# Patient Record
(demographics unavailable — no encounter records)

---

## 2024-11-02 NOTE — DISCUSSION/SUMMARY
[de-identified] : 71 Y F w/ DISH, lumbar stenosis, lumbar spondylosis.   Normal neurologic exam. [facility] L spine MRI reviewed & discussed with patient today. L3-4 advanced disc and facet degeneration, severe lateral recess stenosis BL. No symptoms of neurogenic claudication at this time. Will continue to monitor. No need for further treatment as pt is primarily asymptomatic.  Patient was provided with a lumbar home exercise program. Discussed proper body mechanics and modified physical activity to avoid aggravation of symptoms.   Moving forward I'd like to see as needed.   Prior to appointment and during encounter with patient extensive medical records were reviewed including but not limited to, hospital records, out patient records, imaging results, and lab data. During this appointment the patient was examined, diagnoses were discussed and explained in a face to face manner. In addition extensive time was spent reviewing aforementioned diagnostic studies. Counseling including abnormal image results, differential diagnoses, treatment options, risk and benefits, lifestyle changes, current condition, and current medications was performed. Patient's comments, questions, and concerns were address and patient verbalized understanding. Based on this patient's presentation at our office, which is an orthopedic spine surgeon's office, this patient inherently / intrinsically has a risk, however minute, of developing issues such as Cauda equina syndrome, bowel and bladder changes, or progression of motor or neurological deficits such as paralysis which may be permanent.   I, Tina Frederick, attest that this documentation has been prepared under the direction and in the presence of provider Josh Fermin MD.

## 2024-11-02 NOTE — PHYSICAL EXAM
[Flexion] : flexion [Extension] : extension [Bending to left] : bending to left [Bending to right] : bending to right [de-identified] : Constitutional: - General Appearance: Unremarkable Body Habitus Well Developed Well Nourished Body Habitus No Deformities Well Groomed Ability To communicate: Normal Neurologic: Global sensation is intact to upper and lower extremities. See examination of Neck and/or Spine for exceptions. Orientation to Time, Place and Person is: Normal Mood And Affect is Normal Skin: - Head/Face, Right Upper/Lower Extremity, Left Upper/Lower Extremity: Normal See Examination of Neck and/or Spine for exceptions Cardiovascular: Peripheral Cardiovascular System is Normal Palpation of Lymph Nodes: Normal Palpation of lymph nodes in: Axilla, Cervical, Inguinal Abnormal Palpation of lymph nodes in: None  [] : non-antalgic [FreeTextEntry9] : less than 5 degrees of lateral bending secondary to stiffness.   [TWNoteComboBox7] : forward flexion 75 degrees [de-identified] : left lateral bending 5 degrees [de-identified] : right lateral bending 5 degrees

## 2024-11-02 NOTE — HISTORY OF PRESENT ILLNESS
[Lower back] : lower back [Gradual] : gradual [6] : 6 [2] : 2 [Dull/Aching] : dull/aching [Localized] : localized [Sharp] : sharp [Intermittent] : intermittent [Household chores] : household chores [Leisure] : leisure [Sleep] : sleep [Rest] : rest [Standing] : standing [Walking] : walking [Retired] : Work status: retired [de-identified] : 10/30/2024: 71-year-old female presents today for an initial evaluation, referred by her PCP to further assess lumbar X-rays and MRIs that have been completed. She reports no significant back pain at this time, but experiences intermittent RT sided lower back pain. She also has intermittent posterior right thigh pain when transitioning from seated to standing and while navigating steps. There are no radicular pains in the BL LE, although the patient reports minor LE weakness attributed to a cardiac condition. Additionally, she has intermittent RT hip pain. PMHx: Cardiac conditions. The patient has lost 50 pounds since May 2024, which she attributes to Ozempic. [] : no [FreeTextEntry5] : 1mos of lumbar pain, no injury or fall/trauma.  One morning unable to get out of bed.  Pt went to PCP, xray/MRI was done at MRI.   [FreeTextEntry9] : Tylenol [de-identified] : PCP [de-identified] : xray/MRI Lumbar ZP

## 2024-11-02 NOTE — DISCUSSION/SUMMARY
[de-identified] : 71 Y F w/ DISH, lumbar stenosis, lumbar spondylosis.   Normal neurologic exam. [facility] L spine MRI reviewed & discussed with patient today. L3-4 advanced disc and facet degeneration, severe lateral recess stenosis BL. No symptoms of neurogenic claudication at this time. Will continue to monitor. No need for further treatment as pt is primarily asymptomatic.  Patient was provided with a lumbar home exercise program. Discussed proper body mechanics and modified physical activity to avoid aggravation of symptoms.   Moving forward I'd like to see as needed.   Prior to appointment and during encounter with patient extensive medical records were reviewed including but not limited to, hospital records, out patient records, imaging results, and lab data. During this appointment the patient was examined, diagnoses were discussed and explained in a face to face manner. In addition extensive time was spent reviewing aforementioned diagnostic studies. Counseling including abnormal image results, differential diagnoses, treatment options, risk and benefits, lifestyle changes, current condition, and current medications was performed. Patient's comments, questions, and concerns were address and patient verbalized understanding. Based on this patient's presentation at our office, which is an orthopedic spine surgeon's office, this patient inherently / intrinsically has a risk, however minute, of developing issues such as Cauda equina syndrome, bowel and bladder changes, or progression of motor or neurological deficits such as paralysis which may be permanent.   I, Tina Frederick, attest that this documentation has been prepared under the direction and in the presence of provider Josh Fermin MD.

## 2024-11-02 NOTE — DATA REVIEWED
[FreeTextEntry1] : I have independently reviewed and interpreted these images and reports. [facility] on 08/20/24 RT hip XR- mild RT hip OA mild RT SIJ DJD.  I have independently reviewed and interpreted these images and reports. [facility] Lumbar XR- LT GLORIA adv DDD w/ large anterior and lateral osteophytes, scoliotic curvature of 14 degrees.   On my interpretation of these images from [facility] on I have additionally reviewed the radiologist report. L5-S1: mod-adv DDD, mod facet arthritis, mild stenosis.  L4-5: mod DDD, severe LT FS, mod lateral recess stenosis.  L3-4: adv disc and facet degeneration, severe lateral recess stenosis BL L2-3: adv DDD w/ mod facet arthritis, mod RT, mild LT stenosis.  L1-2: adv DDD.  T12-L1: NL

## 2024-11-02 NOTE — PHYSICAL EXAM
[Flexion] : flexion [Extension] : extension [Bending to left] : bending to left [Bending to right] : bending to right [de-identified] : Constitutional: - General Appearance: Unremarkable Body Habitus Well Developed Well Nourished Body Habitus No Deformities Well Groomed Ability To communicate: Normal Neurologic: Global sensation is intact to upper and lower extremities. See examination of Neck and/or Spine for exceptions. Orientation to Time, Place and Person is: Normal Mood And Affect is Normal Skin: - Head/Face, Right Upper/Lower Extremity, Left Upper/Lower Extremity: Normal See Examination of Neck and/or Spine for exceptions Cardiovascular: Peripheral Cardiovascular System is Normal Palpation of Lymph Nodes: Normal Palpation of lymph nodes in: Axilla, Cervical, Inguinal Abnormal Palpation of lymph nodes in: None  [] : non-antalgic [FreeTextEntry9] : less than 5 degrees of lateral bending secondary to stiffness.   [TWNoteComboBox7] : forward flexion 75 degrees [de-identified] : left lateral bending 5 degrees [de-identified] : right lateral bending 5 degrees

## 2024-11-02 NOTE — HISTORY OF PRESENT ILLNESS
[Lower back] : lower back [Gradual] : gradual [6] : 6 [2] : 2 [Dull/Aching] : dull/aching [Localized] : localized [Sharp] : sharp [Intermittent] : intermittent [Household chores] : household chores [Leisure] : leisure [Sleep] : sleep [Rest] : rest [Standing] : standing [Walking] : walking [Retired] : Work status: retired [de-identified] : 10/30/2024: 71-year-old female presents today for an initial evaluation, referred by her PCP to further assess lumbar X-rays and MRIs that have been completed. She reports no significant back pain at this time, but experiences intermittent RT sided lower back pain. She also has intermittent posterior right thigh pain when transitioning from seated to standing and while navigating steps. There are no radicular pains in the BL LE, although the patient reports minor LE weakness attributed to a cardiac condition. Additionally, she has intermittent RT hip pain. PMHx: Cardiac conditions. The patient has lost 50 pounds since May 2024, which she attributes to Ozempic. [] : no [FreeTextEntry5] : 1mos of lumbar pain, no injury or fall/trauma.  One morning unable to get out of bed.  Pt went to PCP, xray/MRI was done at MRI.   [FreeTextEntry9] : Tylenol [de-identified] : PCP [de-identified] : xray/MRI Lumbar ZP

## 2025-04-22 NOTE — PHYSICAL EXAM
[Normal] : moves all extremities, no focal deficits, normal speech [de-identified] : No carotid bruits auscultated bilaterally [de-identified] : ambulates slowly with cane [de-identified] : well healing incision from PPM

## 2025-04-22 NOTE — DISCUSSION/SUMMARY
[FreeTextEntry1] : 1. Chronic Diastolic HF: patient with normal LV EF, 6/23/2022. Elevated BNP (1779) and dyspneic. Recommended GDMT for diastolic HF with spironolactone 25mg daily, Farxiga 10mg daily, losartan 100mg daily and Labetalol 100mg BID. Repeat BNP decreased to 661 on 9/15/2022. Patient has no dyspnea. She has but lost significant weight on Ozempic. Was having lightheadedness and soft BPs, so recommended reducing losartan to 50mg daily and spironolactone to 12.5mg daily.  Recommend low salt diet and goal BP less than 130/80. Non-obstructive disease on CTA of the coronary arteries, December 2021. Nuclear Stress testing with breast attenuation in 8/15/2022.  2. Mitral Regurgitation: mild-moderate echocardiogram on 6/23/2022. Periodic echo surveillance.   3. HTN: Continue Labetalol to 100mg BID.  Continue losartan 50mg daily, spironolactone 12.5mg daily. Recommend low salt diet. Goal BP less than 130/80.  4. HLD: continue lovastatin 40mg daily.   5. History of CHB: s/p [employer] dual chamber PPM. Recommend regular device checks. Had high atrial lead thresholds post-operatively with differential diagnosis being micro-dislodgement vs. normal acute threshold rise post procedure. Patient had chest X-Ray, which revealed normal lead position. Continue follow up with EP.   6. PAF: evident on event monitor worn by previous cardiologist. Continue Labetalol 100mg BID. Continue Eliquis 5mg BID.  Discussed NSAID interaction with NOAC. Patient with BRENDA and already on CPAP.  7. Coronary Artery Disease: patient s/p CTA of coronary arteries December 2021, which demonstrated non-obstructive disease.  Recommend continuing Aspirin 81mg daily, lovastatin 40mg daily, and Losartan 50mg daily. Goal LDL less than 70.  Follow up in 6 months. [EKG obtained to assist in diagnosis and management of assessed problem(s)] : EKG obtained to assist in diagnosis and management of assessed problem(s)

## 2025-04-22 NOTE — CARDIOLOGY SUMMARY
[de-identified] : 4/22/2025, AS- 8/28/2024, AS- 12/20/2023, NSR with LBBB and intermittent V-pacing. NSR with PVCs, LBBB 9/28/20233/29/2023, V pacing. [de-identified] : 8/15/2022, Pharmacologic nuclear stress testing: reversible anterior wall defect with normal prone imaging, likely breast attenuation artifact. [de-identified] : 6/23/2022, LV EF 80%, mild-moderate MR, no pulmonary HTN

## 2025-04-22 NOTE — ADDENDUM
[FreeTextEntry1] : Patient may proceed from a cardiology standpoint for planned GI procedure. OK to hold Farxiga 72 hours prior and Eliquis for 48 hours prior to procedure.

## 2025-04-22 NOTE — HISTORY OF PRESENT ILLNESS
[FreeTextEntry1] : Historical Perspective: 72  year old female with history of HTN, HLD, uterine cancer s/p radiation and hysterectomy (remission), hypothyroidism, s/p gastric sleeve, CHB s/p [employer] PPM 6/24/2022 presents to establish care with a new cardiologist. Patient is a former [facility] patient. BP has been uncontrolled and feels as if she was not getting this under control so she would like another opinion. She has no chest pain or palpitations.   She had dyspnea on exertion.   There is no history of MI, CVA, CHF, or previous coronary intervention.  Current Health Status: Patient with no chest pain, SOB, or palpitations. No hospitalizations since seeing me last. Remains compliant with medications and reports no adverse effects. Still losing weight on Ozempic. Has lost a little over 80lbs.

## 2025-05-13 NOTE — HISTORY OF PRESENT ILLNESS
[FreeTextEntry1] : Historical Perspective: 72  year old female with history of HTN, HLD, uterine cancer s/p radiation and hysterectomy (remission), hypothyroidism, s/p gastric sleeve, CHB s/p [employer] PPM 6/24/2022 presents to establish care with a new cardiologist. [place] [facility] [place] BP has been uncontrolled and feels as if she was not getting this under control so she would like another opinion. She has no chest pain or palpitations.   She had dyspnea on exertion.   There is no history of MI, CVA, CHF, or previous coronary intervention.  Current Health Status: Patient with no chest pain, SOB, or palpitations. No hospitalizations since seeing me last. Remains compliant with medications and reports no adverse effects. Still losing weight on Ozempic. Has lost a little over 80lbs. She felt lightheaded on Mother's Day. Had a brief episode of LOC upon standing. BP is soft.

## 2025-05-13 NOTE — PHYSICAL EXAM
[Normal] : moves all extremities, no focal deficits, normal speech [de-identified] : No carotid bruits auscultated bilaterally [de-identified] : ambulates slowly with cane [de-identified] : well healing incision from PPM

## 2025-05-13 NOTE — CARDIOLOGY SUMMARY
[de-identified] : 4/22/2025, AS- 8/28/2024, AS- 12/20/2023, NSR with LBBB and intermittent V-pacing. NSR with PVCs, LBBB 9/28/20233/29/2023, V pacing. [de-identified] : 8/15/2022, Pharmacologic nuclear stress testing: reversible anterior wall defect with normal prone imaging, likely breast attenuation artifact. [de-identified] : 6/23/2022, LV EF 80%, mild-moderate MR, no pulmonary HTN

## 2025-05-13 NOTE — DISCUSSION/SUMMARY
[FreeTextEntry1] : 1. Chronic Diastolic HF: patient with normal LV EF, 6/23/2022. Elevated BNP (1779) and dyspneic. Recommended GDMT for diastolic HF with spironolactone 25mg daily, Farxiga 10mg daily, losartan 100mg daily and Labetalol 100mg BID. Repeat BNP decreased to 661 on 9/15/2022. Patient has no dyspnea. She has but lost significant weight on Ozempic. Was having lightheadedness and soft BPs, so recommended reducing losartan to 50mg daily and spironolactone to 12.5mg daily.  Still with symptomatic low BPs. Recommend d/c spironolactone and further reducing losartan to 25mg daily. Recommend low salt diet and goal BP less than 130/80. Non-obstructive disease on CTA of the coronary arteries, December 2021. Nuclear Stress testing with breast attenuation in 8/15/2022.  2. Mitral Regurgitation: mild-moderate echocardiogram on 6/23/2022. Periodic echo surveillance.   3. HTN: Now with symptomatic low BPs. Recommend discontinuing Labetalol to 100mg BID.  Reduce losartan 50mg daily-->25mg daily. Discontinue spironolactone 12.5mg daily. Recommend low salt diet. Goal BP less than 130/80.  4. HLD: continue lovastatin 40mg nightly.   5. History of CHB: s/p [employer] dual chamber PPM. Recommend regular device checks. Had high atrial lead thresholds post-operatively with differential diagnosis being micro-dislodgement vs. normal acute threshold rise post procedure. Patient had chest X-Ray, which revealed normal lead position. Continue follow up with EP.   6. PAF: evident on event monitor worn by previous cardiologist. Discontinue Labetalol 100mg BID because of low BPs. Start Toprol XL 25mg daily instead.  Continue Eliquis 5mg BID.  Discussed NSAID interaction with NOAC. Patient with BRENDA and already on CPAP.  7. Coronary Artery Disease: patient s/p CTA of coronary arteries December 2021, which demonstrated non-obstructive disease.  Recommend continuing Aspirin 81mg daily, lovastatin 40mg daily, and Losartan 50mg daily. Goal LDL less than 70.  Follow up in 1 month.

## 2025-06-24 NOTE — PHYSICAL EXAM
[Normal] : moves all extremities, no focal deficits, normal speech [de-identified] : No carotid bruits auscultated bilaterally [de-identified] : ambulates slowly with cane [de-identified] : well healing incision from PPM

## 2025-06-24 NOTE — DISCUSSION/SUMMARY
[FreeTextEntry1] : 1. Chronic Diastolic HF: patient with normal LV EF, 6/23/2022. Elevated BNP (1779) and dyspneic. Recommended GDMT for diastolic HF with spironolactone 25mg daily, Farxiga 10mg daily, losartan 100mg daily and Labetalol 100mg BID. Repeat BNP decreased to 661 on 9/15/2022. Patient has no dyspnea. She has but lost significant weight on Ozempic. Was having lightheadedness and soft BPs, so recommended reducing losartan to 50mg daily and spironolactone to 12.5mg daily.  Still had symptomatic low BPs. Recommended d/c spironolactone and further reducing losartan to 25mg daily. Now tolerating with no issues. Recommend low salt diet and goal BP less than 130/80. Non-obstructive disease on CTA of the coronary arteries, December 2021. Nuclear Stress testing with breast attenuation in 8/15/2022.  2. Mitral Regurgitation: mild-moderate echocardiogram on 6/23/2022. Periodic echo surveillance.   3. HTN: Now with symptomatic low BPs. Recommend discontinuing Labetalol to 100mg BID.  Reduce losartan 50mg daily-->25mg daily. Discontinue spironolactone 12.5mg daily. Recommend low salt diet. Goal BP less than 130/80.  4. HLD: continue lovastatin 40mg nightly.   5. History of CHB: s/p [employer] dual chamber PPM. Recommend regular device checks. Had high atrial lead thresholds post-operatively with differential diagnosis being micro-dislodgement vs. normal acute threshold rise post procedure. Patient had chest X-Ray, which revealed normal lead position. Continue follow up with EP.   6. PAF: evident on event monitor worn by previous cardiologist. Discontinue Labetalol 100mg BID because of low BPs. Start Toprol XL 25mg daily instead.  Continue Eliquis 5mg BID.  Discussed NSAID interaction with NOAC. Patient with BRENDA and already on CPAP.  7. Coronary Artery Disease: patient s/p CTA of coronary arteries December 2021, which demonstrated non-obstructive disease.  Recommend continuing Aspirin 81mg daily, lovastatin 40mg daily, and Losartan 50mg daily. Goal LDL less than 70.  Follow up in 6 months.

## 2025-06-24 NOTE — CARDIOLOGY SUMMARY
[de-identified] : 4/22/2025, AS- 8/28/2024, AS- 12/20/2023, NSR with LBBB and intermittent V-pacing. NSR with PVCs, LBBB 9/28/20233/29/2023, V pacing. [de-identified] : 8/15/2022, Pharmacologic nuclear stress testing: reversible anterior wall defect with normal prone imaging, likely breast attenuation artifact. [de-identified] : 6/23/2022, LV EF 80%, mild-moderate MR, no pulmonary HTN

## 2025-06-24 NOTE — HISTORY OF PRESENT ILLNESS
[FreeTextEntry1] : Historical Perspective: 72  year old female with history of HTN, HLD, uterine cancer s/p radiation and hysterectomy (remission), hypothyroidism, s/p gastric sleeve, CHB s/p [employer] PPM 6/24/2022 presents to establish care with a new cardiologist. Patient is a former [facility] patient. BP has been uncontrolled and feels as if she was not getting this under control so she would like another opinion. She has no chest pain or palpitations.   She had dyspnea on exertion.   There is no history of MI, CVA, CHF, or previous coronary intervention.  Current Health Status: Patient with no chest pain, SOB, or palpitations. No hospitalizations since seeing me last. Remains compliant with medications and reports no adverse effects.